# Patient Record
Sex: MALE | Race: BLACK OR AFRICAN AMERICAN | NOT HISPANIC OR LATINO
[De-identification: names, ages, dates, MRNs, and addresses within clinical notes are randomized per-mention and may not be internally consistent; named-entity substitution may affect disease eponyms.]

---

## 2019-10-24 ENCOUNTER — HOSPITAL (OUTPATIENT)
Dept: OTHER | Age: 24
End: 2019-10-24

## 2019-10-24 LAB
ANALYZER ANC (IANC): ABNORMAL
BASOPHILS # BLD: 0.1 K/MCL (ref 0–0.3)
BASOPHILS NFR BLD: 1 %
CREAT SERPL-MCNC: 1.05 MG/DL (ref 0.67–1.17)
DIFFERENTIAL METHOD BLD: ABNORMAL
EOSINOPHIL # BLD: 0.5 K/MCL (ref 0.1–0.5)
EOSINOPHIL NFR BLD: 4 %
ERYTHROCYTE [DISTWIDTH] IN BLOOD: 12.7 % (ref 11–15)
ETHANOL SERPL-MCNC: 63 MG/DL
HCT VFR BLD CALC: 49.7 % (ref 39–51)
HGB BLD-MCNC: 16 G/DL (ref 13–17)
IMM GRANULOCYTES # BLD AUTO: 0.1 K/MCL (ref 0–0.2)
IMM GRANULOCYTES NFR BLD: 1 %
LYMPHOCYTES # BLD: 4.5 K/MCL (ref 1–4.8)
LYMPHOCYTES NFR BLD: 38 %
MCH RBC QN AUTO: 26.9 PG (ref 26–34)
MCHC RBC AUTO-ENTMCNC: 32.2 G/DL (ref 32–36.5)
MCV RBC AUTO: 83.7 FL (ref 78–100)
MONOCYTES # BLD: 0.9 K/MCL (ref 0.3–0.9)
MONOCYTES NFR BLD: 7 %
NEUTROPHILS # BLD: 5.9 K/MCL (ref 1.8–7.7)
NEUTROPHILS NFR BLD: 49 %
NEUTS SEG NFR BLD: ABNORMAL %
NRBC (NRBCRE): 0 /100 WBC
PLATELET # BLD: 365 K/MCL (ref 140–450)
RBC # BLD: 5.94 MIL/MCL (ref 4.5–5.9)
WBC # BLD: 11.9 K/MCL (ref 4.2–11)

## 2019-10-24 PROCEDURE — 99285 EMERGENCY DEPT VISIT HI MDM: CPT | Performed by: SURGERY

## 2023-09-23 ENCOUNTER — HOSPITAL ENCOUNTER (EMERGENCY)
Age: 28
Discharge: HOME | End: 2023-09-23
Attending: EMERGENCY MEDICINE
Payer: COMMERCIAL

## 2023-09-23 VITALS
SYSTOLIC BLOOD PRESSURE: 111 MMHG | HEART RATE: 71 BPM | RESPIRATION RATE: 16 BRPM | DIASTOLIC BLOOD PRESSURE: 72 MMHG | OXYGEN SATURATION: 98 % | BODY MASS INDEX: 22.9 KG/M2 | WEIGHT: 160 LBS | TEMPERATURE: 97.6 F | HEIGHT: 70 IN

## 2023-09-23 DIAGNOSIS — Z00.8 MEDICAL CLEARANCE FOR INCARCERATION: Primary | ICD-10-CM

## 2023-09-23 PROCEDURE — 99282 EMERGENCY DEPT VISIT SF MDM: CPT

## 2023-09-23 ASSESSMENT — ENCOUNTER SYMPTOMS
ABDOMINAL PAIN: 0
SHORTNESS OF BREATH: 0
COUGH: 0
NAUSEA: 0
VOMITING: 0

## 2023-09-23 ASSESSMENT — PAIN - FUNCTIONAL ASSESSMENT: PAIN_FUNCTIONAL_ASSESSMENT: NONE - DENIES PAIN

## 2023-09-23 NOTE — ED PROVIDER NOTES
Emergency Department Attending Physician Note  Location: [address] [place] [address] ED  9/23/2023       Pt Name: Jamaica Lorenzana  MRN: [id number]  [address] [facility] 1995    Date of evaluation: 9/23/2023  Provider: Ever Cooks, DO  PCP: No primary care provider on file. Note Started: 12:48 PM EDT 9/23/23    CHIEF COMPLAINT  Chief Complaint   Patient presents with    OTHER     Pt brought in for intermediate clearance by [employer]. Reports that patient took an unknown substance. Officer reports pt had white substance on mouth. Officer stated that patient told him it was ibuprofen. Pt denies complaints. HISTORY OF PRESENT ILLNESS:  History obtained by patient. Limitations to history : None. Jamaica Lorenzana is a 28 y.o. male with no significant past medical history, presenting with department today for medical clearance to go to intermediate. Patient reportedly had a white substance around his mouth while sitting the back of a cruiser. Patient says he did not swallow any baggies, did not take anything, and only took ibuprofen. There was a visualized small baggy on the backseat of the cruiser, and they are not quite sure what patient took. This was about 45 minutes to 1 hour ago. Patient arrives emerged department stating that he does not want any medical interventions right now and says that he is totally fine. He denies any chest pain, shortness breath, nausea, vomiting, diarrhea. His vital signs are complete stable. No headaches or injuries. Denies any other concerns. Says he did not swallow any baggies. Has no other paraphernalia on him at this time per the officer. He is going to intermediate where he can be monitored. Nursing Notes were all reviewed and agreed with or any disagreements were addressed in the HPI. MEDICAL HISTORY  History reviewed. No pertinent past medical history. SURGICAL HISTORY  History reviewed. No pertinent surgical history.     CURRENT MEDICATIONS  There are no discharge medications Breath sounds: Normal breath sounds. Musculoskeletal:         General: No swelling or tenderness. Cervical back: Normal range of motion and neck supple. Right lower leg: No edema. Left lower leg: No edema. Skin:     General: Skin is warm and dry. Findings: No rash. Neurological:      General: No focal deficit present. Mental Status: He is alert and oriented to person, place, and time. Psychiatric:         Mood and Affect: Mood normal.         Thought Content: Thought content normal.         PROCEDURES:  Unless otherwise noted below, none. Procedures      MEDICATIONS:   Medications - No data to display    _____________________________________    MEDICAL DECISION MAKING:     Patient is a 28 y.o. male with no significant past medical history, presenting emerged department today with concerns of white powder around his mouth, and it sounds like a drug ingestion. Patient arrives alert, conversational, oriented, in police custody, and completely stable vital signs. He really does not want any medical intervention, says he feels just fine. The officer only brought him here for medical clearance, but no warranted lab or work-up. Consider getting EKG, however vital signs are completely stable. Considering medical toxidrome; doubt opiates, as he is alert, conversational, no respiratory depression. Doubt sympathomimetic, as he is quite alert, conversational, with stable vital signs, and this happened over at least 45 minutes ago, but closer to an hour. Otherwise, unclear if patient took an ibuprofen, but he says he feels just fine. Risk and benefits discussed of further work-up, and officer states that patient will be at the nursing home where he can be monitored and they will definitely bring him back if anything changes. Discharged to nursing home in stable condition. Is this patient to be included in the SEP-1 Core Measure due to severe sepsis or septic shock?    No   Exclusion criteria -